# Patient Record
Sex: MALE | Race: WHITE | Employment: OTHER | URBAN - METROPOLITAN AREA
[De-identification: names, ages, dates, MRNs, and addresses within clinical notes are randomized per-mention and may not be internally consistent; named-entity substitution may affect disease eponyms.]

---

## 2017-10-01 ENCOUNTER — APPOINTMENT (OUTPATIENT)
Dept: GENERAL RADIOLOGY | Age: 22
End: 2017-10-01
Attending: PHYSICIAN ASSISTANT
Payer: OTHER GOVERNMENT

## 2017-10-01 ENCOUNTER — HOSPITAL ENCOUNTER (EMERGENCY)
Age: 22
Discharge: HOME | End: 2017-10-01
Attending: EMERGENCY MEDICINE | Admitting: EMERGENCY MEDICINE
Payer: OTHER GOVERNMENT

## 2017-10-01 VITALS
HEART RATE: 76 BPM | DIASTOLIC BLOOD PRESSURE: 81 MMHG | WEIGHT: 140 LBS | HEIGHT: 72 IN | OXYGEN SATURATION: 99 % | TEMPERATURE: 97.9 F | RESPIRATION RATE: 15 BRPM | SYSTOLIC BLOOD PRESSURE: 107 MMHG | BODY MASS INDEX: 18.96 KG/M2

## 2017-10-01 DIAGNOSIS — S81.812A LACERATION OF LEFT LOWER EXTREMITY, INITIAL ENCOUNTER: Primary | ICD-10-CM

## 2017-10-01 PROCEDURE — 77030031132 HC SUT NYL COVD -A

## 2017-10-01 PROCEDURE — 74011250637 HC RX REV CODE- 250/637: Performed by: PHYSICIAN ASSISTANT

## 2017-10-01 PROCEDURE — 77030018836 HC SOL IRR NACL ICUM -A

## 2017-10-01 PROCEDURE — 99283 EMERGENCY DEPT VISIT LOW MDM: CPT

## 2017-10-01 PROCEDURE — 73552 X-RAY EXAM OF FEMUR 2/>: CPT

## 2017-10-01 PROCEDURE — 75810000293 HC SIMP/SUPERF WND  RPR

## 2017-10-01 RX ORDER — TRAMADOL HYDROCHLORIDE 50 MG/1
50 TABLET ORAL
Qty: 12 TAB | Refills: 0 | Status: SHIPPED | OUTPATIENT
Start: 2017-10-01

## 2017-10-01 RX ORDER — HYDROCODONE BITARTRATE AND ACETAMINOPHEN 5; 325 MG/1; MG/1
1 TABLET ORAL
Status: COMPLETED | OUTPATIENT
Start: 2017-10-01 | End: 2017-10-01

## 2017-10-01 RX ADMIN — HYDROCODONE BITARTRATE AND ACETAMINOPHEN 1 TABLET: 5; 325 TABLET ORAL at 22:00

## 2017-10-01 NOTE — LETTER
NOTIFICATION RETURN TO WORK / SCHOOL 
 
10/1/2017 9:55 PM 
 
Mr. Sanjeev Garza 
[place] [id number] To Whom It May Concern: 
 
Sanjeev Garza is currently under the care of [facility] EMERGENCY DEPT. He will return to work/school on: 10/3/17 If there are questions or concerns please have the patient contact our office.  
 
 
 
Sincerely, 
 
 
GUERO Merino

## 2017-10-02 NOTE — ED PROVIDER NOTES
HPI Comments: Kristina Boast is a 20 y.o. male that presents to the ED with a complaint of a laceration to his left thigh. PT statest hat he was working at home trying to open a box when his knife slipped cutting his thigh. Wound was dressed by a medic friend at home and pt was brought here. 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 No other complaints at this time    Patient is a 20 y.o. male presenting with skin laceration. Laceration           History reviewed. No pertinent past medical history. History reviewed. No pertinent surgical history. History reviewed. No pertinent family history. Social History     Social History    Marital status: SINGLE     Spouse name: N/A    Number of children: N/A    Years of education: N/A     Occupational History    Not on file. Social History Main Topics    Smoking status: Current Every Day Smoker     Packs/day: 0.25    Smokeless tobacco: Not on file    Alcohol use Yes    Drug use: No    Sexual activity: Not on file     Other Topics Concern    Not on file     Social History Narrative    No narrative on file         ALLERGIES: Review of patient's allergies indicates no known allergies. Review of Systems   Constitutional: Negative for fatigue and fever. Respiratory: Negative for cough and shortness of breath. Cardiovascular: Negative for chest pain. Gastrointestinal: Negative for abdominal pain, diarrhea, nausea and vomiting. Musculoskeletal: Negative for back pain. Skin: Positive for wound. Neurological: Negative for dizziness and headaches. All other systems reviewed and are negative. Vitals:    10/01/17 2030   BP: 107/81   Pulse: 76   Resp: 15   Temp: 97.9 °F (36.6 °C)   SpO2: 99%   Weight: 63.5 kg (140 lb)   Height: 6' (1.829 m)            Physical Exam   Constitutional: He is oriented to person, place, and time. He appears well-developed and well-nourished. He appears distressed.    HENT:   Head: Normocephalic and atraumatic. Eyes: Conjunctivae are normal. Pupils are equal, round, and reactive to light. Neck: Normal range of motion. Cardiovascular: Normal rate. Pulmonary/Chest: Effort normal. No respiratory distress. Musculoskeletal: Normal range of motion. Neurological: He is oriented to person, place, and time. Skin: Laceration noted. 2 cm linear laceration to left thigh. 1 cm deep   Psychiatric: He has a normal mood and affect. His behavior is normal.        MDM  Number of Diagnoses or Management Options  Diagnosis management comments: Impression: laceration leg    Plan: wound closure  Discharge home  Discharge home  Keep area clean and dry today  Bathe as normal tomorrow but avoid scrubbing affected area  Keep wound covered at work  Take any medications as prescribed  Tylenol/Motrin for pain  Return 7 days for suture removal, sooner if signs of infection (tenderness, redness, swelling or discharge)         Amount and/or Complexity of Data Reviewed  Tests in the radiology section of CPT®: ordered and reviewed    Risk of Complications, Morbidity, and/or Mortality  Presenting problems: low  Diagnostic procedures: low  Management options: low    Patient Progress  Patient progress: stable    ED Course       Wound Repair  Date/Time: 10/1/2017 9:51 PM  Performed by: [id number] [employer] [facility] provider: Lobo Woody  Preparation: skin prepped with Shur-Clens  Pre-procedure re-eval: Immediately prior to the procedure, the patient was reevaluated and found suitable for the planned procedure and any planned medications. Time out: Immediately prior to the procedure a time out was called to verify the correct patient, procedure, equipment, staff and marking as appropriate. .  Location details: left leg  Wound length:2.5 cm or less  Anesthesia: local infiltration    Anesthesia:  Local Anesthetic: lidocaine 1% without epinephrine  Anesthetic total: 5 mL  Foreign bodies: no foreign bodies  Irrigation solution: saline  Irrigation method: syringe  Debridement: minimal  Skin closure: 5-0 nylon  Number of sutures: 2  Technique: vertical mattress  Approximation: close  Dressing: non-adhesive packing strip  Patient tolerance: Patient tolerated the procedure well with no immediate complications  My total time at bedside, performing this procedure was 1-15 minutes. Vitals:  Patient Vitals for the past 12 hrs:   Temp Pulse Resp BP SpO2   10/01/17 2030 97.9 °F (36.6 °C) 76 15 107/81 99 %         Medications ordered:   Medications - No data to display      Lab findings:  No results found for this or any previous visit (from the past 12 hour(s)). X-Ray, CT or other radiology findings or impressions:  XR FEMUR LT 2 V    (Results Pending)       Progress notes, Consult notes or additional Procedure notes:       Disposition:  Diagnosis: No diagnosis found.     Disposition: discharge    Follow-up Information     None           Patient's Medications    No medications on file

## 2017-10-02 NOTE — ED TRIAGE NOTES
Arrives by EMS to triage. C/o 2-3 cm laceration to left thigh. States accidentally cut himself with a knife.
